# Patient Record
Sex: FEMALE | Race: WHITE | NOT HISPANIC OR LATINO | URBAN - METROPOLITAN AREA
[De-identification: names, ages, dates, MRNs, and addresses within clinical notes are randomized per-mention and may not be internally consistent; named-entity substitution may affect disease eponyms.]

---

## 2023-03-20 LAB
ALANINE AMINOTRANSFERASE (SGPT) (U/L) IN SER/PLAS: 40 U/L (ref 7–45)
ALBUMIN (G/DL) IN SER/PLAS: 4.1 G/DL (ref 3.4–5)
ALKALINE PHOSPHATASE (U/L) IN SER/PLAS: 56 U/L (ref 33–136)
ANION GAP IN SER/PLAS: 12 MMOL/L (ref 10–20)
ASPARTATE AMINOTRANSFERASE (SGOT) (U/L) IN SER/PLAS: 25 U/L (ref 9–39)
BILIRUBIN TOTAL (MG/DL) IN SER/PLAS: 0.4 MG/DL (ref 0–1.2)
C REACTIVE PROTEIN (MG/L) IN SER/PLAS: 0.74 MG/DL
CALCIUM (MG/DL) IN SER/PLAS: 9.2 MG/DL (ref 8.6–10.3)
CARBON DIOXIDE, TOTAL (MMOL/L) IN SER/PLAS: 34 MMOL/L (ref 21–32)
CHLORIDE (MMOL/L) IN SER/PLAS: 100 MMOL/L (ref 98–107)
CREATININE (MG/DL) IN SER/PLAS: 0.59 MG/DL (ref 0.5–1.05)
DEAMIDATED GLIADIN PEPTIDE IGA: <1 U/ML (ref 0–14)
DEAMIDATED GLIADIN PEPTIDE IGG: <1 U/ML (ref 0–14)
ERYTHROCYTE DISTRIBUTION WIDTH (RATIO) BY AUTOMATED COUNT: 13.2 % (ref 11.5–14.5)
ERYTHROCYTE MEAN CORPUSCULAR HEMOGLOBIN CONCENTRATION (G/DL) BY AUTOMATED: 30.3 G/DL (ref 32–36)
ERYTHROCYTE MEAN CORPUSCULAR VOLUME (FL) BY AUTOMATED COUNT: 89 FL (ref 80–100)
ERYTHROCYTES (10*6/UL) IN BLOOD BY AUTOMATED COUNT: 4.96 X10E12/L (ref 4–5.2)
GFR FEMALE: >90 ML/MIN/1.73M2
GLUCOSE (MG/DL) IN SER/PLAS: 127 MG/DL (ref 74–99)
HEMATOCRIT (%) IN BLOOD BY AUTOMATED COUNT: 43.9 % (ref 36–46)
HEMOGLOBIN (G/DL) IN BLOOD: 13.3 G/DL (ref 12–16)
HEPATITIS A VIRUS IGM AB PRESENCE IN SER/PLAS BY IMMUNOASSAY: NONREACTIVE
HEPATITIS B VIRUS CORE IGM AB PRESENCE IN SER/PLAS BY IMMUNOASSY: NONREACTIVE
HEPATITIS B VIRUS SURFACE AG PRESENCE IN SERUM: NONREACTIVE
HEPATITIS C VIRUS AB PRESENCE IN SERUM: NONREACTIVE
INR IN PPP BY COAGULATION ASSAY: 1 (ref 0.9–1.1)
IRON (UG/DL) IN SER/PLAS: 72 UG/DL (ref 35–150)
IRON BINDING CAPACITY (UG/DL) IN SER/PLAS: 345 UG/DL (ref 240–445)
IRON SATURATION (%) IN SER/PLAS: 21 % (ref 25–45)
LEUKOCYTES (10*3/UL) IN BLOOD BY AUTOMATED COUNT: 6.9 X10E9/L (ref 4.4–11.3)
NRBC (PER 100 WBCS) BY AUTOMATED COUNT: 0 /100 WBC (ref 0–0)
PLATELETS (10*3/UL) IN BLOOD AUTOMATED COUNT: 168 X10E9/L (ref 150–450)
POTASSIUM (MMOL/L) IN SER/PLAS: 3.6 MMOL/L (ref 3.5–5.3)
PROTEIN TOTAL: 6.8 G/DL (ref 6.4–8.2)
PROTHROMBIN TIME (PT) IN PPP BY COAGULATION ASSAY: 11.3 SEC (ref 9.8–13.4)
SODIUM (MMOL/L) IN SER/PLAS: 142 MMOL/L (ref 136–145)
TISSUE TRANSGLUTAMINASE IGG: <1 U/ML (ref 0–14)
TISSUE TRANSGLUTAMINASE, IGA: <1 U/ML (ref 0–14)
UREA NITROGEN (MG/DL) IN SER/PLAS: 10 MG/DL (ref 6–23)

## 2023-06-12 ENCOUNTER — TELEPHONE (OUTPATIENT)
Dept: PRIMARY CARE | Facility: CLINIC | Age: 63
End: 2023-06-12
Payer: MEDICARE

## 2023-06-12 NOTE — TELEPHONE ENCOUNTER
Reva from [facility] by [facility] called. She said they have to report to the Primary when she is seen in the nursing home ([facility])    Her phone number is [phone] if we ever needed to contact her

## 2023-07-27 ENCOUNTER — TELEPHONE (OUTPATIENT)
Dept: PRIMARY CARE | Facility: CLINIC | Age: 63
End: 2023-07-27
Payer: MEDICARE

## 2023-07-27 NOTE — TELEPHONE ENCOUNTER
Pt is due for her colonoscopy. According to her chart, she last had one 5/22/2011.    Please let me know when this is ordered so I can call pt.

## 2023-08-02 NOTE — TELEPHONE ENCOUNTER
Both of pts numbers are for care facilities. Both employees told me that pt has not been there in months. Unsure of how to reach pt

## 2023-09-15 ENCOUNTER — HOSPITAL ENCOUNTER (OUTPATIENT)
Dept: DATA CONVERSION | Facility: HOSPITAL | Age: 63
End: 2023-09-15
Attending: STUDENT IN AN ORGANIZED HEALTH CARE EDUCATION/TRAINING PROGRAM

## 2023-09-22 ENCOUNTER — HOSPITAL ENCOUNTER (OUTPATIENT)
Dept: DATA CONVERSION | Facility: HOSPITAL | Age: 63
End: 2023-09-22
Attending: STUDENT IN AN ORGANIZED HEALTH CARE EDUCATION/TRAINING PROGRAM | Admitting: STUDENT IN AN ORGANIZED HEALTH CARE EDUCATION/TRAINING PROGRAM
Payer: COMMERCIAL

## 2023-09-22 DIAGNOSIS — K29.70 GASTRITIS, UNSPECIFIED, WITHOUT BLEEDING: ICD-10-CM

## 2023-09-22 DIAGNOSIS — D12.2 BENIGN NEOPLASM OF ASCENDING COLON: ICD-10-CM

## 2023-09-22 DIAGNOSIS — D12.0 BENIGN NEOPLASM OF CECUM: ICD-10-CM

## 2023-09-22 DIAGNOSIS — K62.1 RECTAL POLYP: ICD-10-CM

## 2023-09-22 DIAGNOSIS — Z12.11 ENCOUNTER FOR SCREENING FOR MALIGNANT NEOPLASM OF COLON: ICD-10-CM

## 2023-09-22 DIAGNOSIS — K52.9 NONINFECTIVE GASTROENTERITIS AND COLITIS, UNSPECIFIED: ICD-10-CM

## 2023-09-22 DIAGNOSIS — R13.10 DYSPHAGIA, UNSPECIFIED: ICD-10-CM

## 2023-09-22 DIAGNOSIS — R13.10 DYSPHAGIA: ICD-10-CM

## 2023-09-22 DIAGNOSIS — K57.30 DIVERTICULOSIS OF LARGE INTESTINE WITHOUT PERFORATION OR ABSCESS WITHOUT BLEEDING: ICD-10-CM

## 2023-09-22 LAB — POCT GLUCOSE: 99 MG/DL (ref 74–99)

## 2023-09-27 LAB
COMPLETE PATHOLOGY REPORT: NORMAL
CONVERTED CLINICAL DIAGNOSIS-HISTORY: NORMAL
CONVERTED FINAL DIAGNOSIS: NORMAL
CONVERTED FINAL REPORT PDF LINK TO COPY AND PASTE: NORMAL
CONVERTED GROSS DESCRIPTION: NORMAL

## 2023-09-29 VITALS — HEIGHT: 61 IN | WEIGHT: 246.91 LBS | BODY MASS INDEX: 46.62 KG/M2

## 2023-10-04 PROBLEM — K52.9 CHRONIC DIARRHEA: Status: ACTIVE | Noted: 2023-10-04

## 2023-10-04 PROBLEM — H52.13 MYOPIA WITH PRESBYOPIA OF BOTH EYES: Status: ACTIVE | Noted: 2023-10-04

## 2023-10-04 PROBLEM — E66.01 MORBID (SEVERE) OBESITY DUE TO EXCESS CALORIES (MULTI): Status: ACTIVE | Noted: 2023-10-04

## 2023-10-04 PROBLEM — F25.9 SCHIZOAFFECTIVE DISORDER (MULTI): Status: ACTIVE | Noted: 2023-10-04

## 2023-10-04 PROBLEM — M06.9 ARTHRITIS OR POLYARTHRITIS, RHEUMATOID (MULTI): Status: ACTIVE | Noted: 2023-10-04

## 2023-10-04 PROBLEM — J30.2 SEASONAL ALLERGIES: Status: ACTIVE | Noted: 2023-10-04

## 2023-10-04 PROBLEM — R23.9 MACERATION OF PERIWOUND SKIN: Status: ACTIVE | Noted: 2023-10-04

## 2023-10-04 PROBLEM — H93.13 TINNITUS OF BOTH EARS: Status: ACTIVE | Noted: 2023-10-04

## 2023-10-04 PROBLEM — R13.12 DYSPHAGIA, OROPHARYNGEAL: Status: ACTIVE | Noted: 2023-10-04

## 2023-10-04 PROBLEM — I61.9 HEMORRHAGIC CEREBROVASCULAR ACCIDENT (CVA) (MULTI): Status: ACTIVE | Noted: 2023-10-04

## 2023-10-04 PROBLEM — R00.2 PALPITATIONS: Status: ACTIVE | Noted: 2023-10-04

## 2023-10-04 PROBLEM — G89.29 CHRONIC PAIN: Status: ACTIVE | Noted: 2023-10-04

## 2023-10-04 PROBLEM — D05.11 DUCTAL CARCINOMA IN SITU (DCIS) OF RIGHT BREAST: Status: ACTIVE | Noted: 2023-10-04

## 2023-10-04 PROBLEM — R26.2 IMPAIRED AMBULATION: Status: ACTIVE | Noted: 2023-10-04

## 2023-10-04 PROBLEM — L03.90 CELLULITIS: Status: ACTIVE | Noted: 2023-10-04

## 2023-10-04 PROBLEM — J34.2 NASAL SEPTAL DEVIATION: Status: ACTIVE | Noted: 2023-10-04

## 2023-10-04 PROBLEM — J34.829 INCOMPETENT NASAL VALVE: Status: ACTIVE | Noted: 2023-10-04

## 2023-10-04 PROBLEM — K21.9 GASTROESOPHAGEAL REFLUX DISEASE: Status: ACTIVE | Noted: 2023-10-04

## 2023-10-04 PROBLEM — I24.9 ACUTE CORONARY SYNDROME (MULTI): Status: ACTIVE | Noted: 2022-01-25

## 2023-10-04 PROBLEM — R26.89 IMBALANCE: Status: ACTIVE | Noted: 2023-10-04

## 2023-10-04 PROBLEM — R51.9 HEADACHE: Status: ACTIVE | Noted: 2023-10-04

## 2023-10-04 PROBLEM — R41.82 ALTERED MENTAL STATUS: Status: ACTIVE | Noted: 2023-10-04

## 2023-10-04 PROBLEM — F41.9 ANXIETY: Status: ACTIVE | Noted: 2023-10-04

## 2023-10-04 PROBLEM — R68.89 DECREASED ACTIVITY TOLERANCE: Status: ACTIVE | Noted: 2023-10-04

## 2023-10-04 PROBLEM — C50.519 MALIGNANT NEOPLASM OF LOWER-OUTER QUADRANT OF FEMALE BREAST (MULTI): Status: ACTIVE | Noted: 2023-10-04

## 2023-10-04 PROBLEM — R53.83 FATIGUE: Status: ACTIVE | Noted: 2023-10-04

## 2023-10-04 PROBLEM — E78.5 HYPERLIPIDEMIA: Status: ACTIVE | Noted: 2023-10-04

## 2023-10-04 PROBLEM — M81.0 OSTEOPOROSIS, SENILE: Status: ACTIVE | Noted: 2023-10-04

## 2023-10-04 PROBLEM — R53.1 WEAKNESS GENERALIZED: Status: ACTIVE | Noted: 2023-10-04

## 2023-10-04 PROBLEM — J34.3 HYPERTROPHY OF BOTH INFERIOR NASAL TURBINATES: Status: ACTIVE | Noted: 2023-10-04

## 2023-10-04 PROBLEM — N18.9 CHRONIC KIDNEY DISEASE, UNSPECIFIED: Status: ACTIVE | Noted: 2023-10-04

## 2023-10-04 PROBLEM — I47.10 PAROXYSMAL SVT (SUPRAVENTRICULAR TACHYCARDIA) (CMS-HCC): Status: ACTIVE | Noted: 2023-10-04

## 2023-10-04 PROBLEM — R29.90 NEUROLOGICAL SYMPTOMS: Status: ACTIVE | Noted: 2023-10-04

## 2023-10-04 PROBLEM — M17.9 OSTEOARTHRITIS OF KNEE, UNSPECIFIED: Status: ACTIVE | Noted: 2023-10-04

## 2023-10-04 PROBLEM — M95.0 INCOMPETENT NASAL VALVE: Status: ACTIVE | Noted: 2023-10-04

## 2023-10-04 PROBLEM — Z96.1 PSEUDOPHAKIA OF RIGHT EYE: Status: ACTIVE | Noted: 2023-10-04

## 2023-10-04 PROBLEM — H60.60 OTITIS EXTERNA, CHRONIC: Status: ACTIVE | Noted: 2023-10-04

## 2023-10-04 PROBLEM — R63.2 POLYPHAGIA: Status: ACTIVE | Noted: 2023-10-04

## 2023-10-04 PROBLEM — R06.02 SHORTNESS OF BREATH: Status: ACTIVE | Noted: 2023-10-04

## 2023-10-04 PROBLEM — G40.909 SEIZURE DISORDER (MULTI): Status: ACTIVE | Noted: 2023-10-04

## 2023-10-04 PROBLEM — J31.0 CHRONIC RHINITIS: Status: ACTIVE | Noted: 2023-10-04

## 2023-10-04 PROBLEM — G57.90 NEUROPATHY OF LOWER EXTREMITY: Status: ACTIVE | Noted: 2023-10-04

## 2023-10-04 PROBLEM — R06.83 PRIMARY SNORING: Status: ACTIVE | Noted: 2023-10-04

## 2023-10-04 PROBLEM — I26.99 PULMONARY EMBOLISM (MULTI): Status: ACTIVE | Noted: 2023-10-04

## 2023-10-04 PROBLEM — R60.0 LOWER LEG EDEMA: Status: ACTIVE | Noted: 2023-10-04

## 2023-10-04 PROBLEM — B37.2 CANDIDIASIS, INTERTRIGO: Status: ACTIVE | Noted: 2023-10-04

## 2023-10-04 PROBLEM — J44.9 COPD (CHRONIC OBSTRUCTIVE PULMONARY DISEASE) (MULTI): Status: ACTIVE | Noted: 2020-07-14

## 2023-10-04 PROBLEM — H53.8 BLURRING OF VISUAL IMAGE: Status: ACTIVE | Noted: 2023-10-04

## 2023-10-04 PROBLEM — N32.81 OVERACTIVE BLADDER: Status: ACTIVE | Noted: 2023-10-04

## 2023-10-04 PROBLEM — F17.200 TOBACCO DEPENDENCE SYNDROME: Status: ACTIVE | Noted: 2023-10-04

## 2023-10-04 PROBLEM — I10 ESSENTIAL HYPERTENSION: Status: ACTIVE | Noted: 2023-10-04

## 2023-10-04 PROBLEM — H91.93 BILATERAL HEARING LOSS: Status: ACTIVE | Noted: 2023-10-04

## 2023-10-04 PROBLEM — R11.2 NAUSEA AND VOMITING: Status: ACTIVE | Noted: 2023-10-04

## 2023-10-04 PROBLEM — F51.05 INSOMNIA RELATED TO ANOTHER MENTAL DISORDER: Status: ACTIVE | Noted: 2023-10-04

## 2023-10-04 PROBLEM — E55.9 VITAMIN D DEFICIENCY: Status: ACTIVE | Noted: 2023-10-04

## 2023-10-04 PROBLEM — R32 INCONTINENCE: Status: ACTIVE | Noted: 2023-10-04

## 2023-10-04 PROBLEM — F33.3 SEVERE EPISODE OF RECURRENT MAJOR DEPRESSIVE DISORDER, WITH PSYCHOTIC FEATURES (MULTI): Status: ACTIVE | Noted: 2023-10-04

## 2023-10-04 PROBLEM — R30.0 DYSURIA: Status: ACTIVE | Noted: 2023-10-04

## 2023-10-04 PROBLEM — M62.838 MUSCLE SPASM: Status: ACTIVE | Noted: 2023-10-04

## 2023-10-04 PROBLEM — R42 LIGHTHEADEDNESS: Status: ACTIVE | Noted: 2023-10-04

## 2023-10-04 PROBLEM — H55.01 CONGENITAL NYSTAGMUS: Status: ACTIVE | Noted: 2017-02-27

## 2023-10-04 PROBLEM — H54.40 BLIND LEFT EYE: Status: ACTIVE | Noted: 2023-10-04

## 2023-10-04 PROBLEM — H52.4 MYOPIA WITH PRESBYOPIA OF BOTH EYES: Status: ACTIVE | Noted: 2023-10-04

## 2023-10-04 PROBLEM — F70 MILD INTELLECTUAL DISABILITY: Status: ACTIVE | Noted: 2023-10-04

## 2023-10-04 PROBLEM — R73.03 PREDIABETES: Status: ACTIVE | Noted: 2023-10-04

## 2023-10-04 PROBLEM — S31.809A GLUTEAL CLEFT WOUND: Status: ACTIVE | Noted: 2023-10-04

## 2023-10-04 PROBLEM — H27.10: Status: ACTIVE | Noted: 2023-10-04

## 2023-10-04 PROBLEM — I67.89 ACUTE, BUT ILL-DEFINED, CEREBROVASCULAR DISEASE: Status: ACTIVE | Noted: 2023-10-04

## 2023-10-04 PROBLEM — G47.9 SLEEP DISTURBANCES: Status: ACTIVE | Noted: 2023-10-04

## 2023-10-04 PROBLEM — H18.422 BAND KERATOPATHY OF LEFT EYE: Status: ACTIVE | Noted: 2023-10-04

## 2023-10-04 RX ORDER — MONTELUKAST SODIUM 10 MG/1
1 TABLET ORAL NIGHTLY
COMMUNITY
Start: 2018-05-17

## 2023-10-04 RX ORDER — LEVETIRACETAM 500 MG/1
TABLET ORAL
COMMUNITY
Start: 2023-08-30

## 2023-10-04 RX ORDER — CODEINE PHOSPHATE AND GUAIFENESIN 10; 100 MG/5ML; MG/5ML
SOLUTION ORAL
COMMUNITY

## 2023-10-04 RX ORDER — NALTREXONE HYDROCHLORIDE 50 MG/1
TABLET, FILM COATED ORAL
COMMUNITY
Start: 2019-08-05 | End: 2024-02-02 | Stop reason: ALTCHOICE

## 2023-10-04 RX ORDER — METOPROLOL TARTRATE 25 MG/1
1 TABLET, FILM COATED ORAL 2 TIMES DAILY
COMMUNITY

## 2023-10-04 RX ORDER — BUSPIRONE HYDROCHLORIDE 10 MG/1
TABLET ORAL
COMMUNITY
Start: 2016-11-10 | End: 2024-02-02 | Stop reason: ALTCHOICE

## 2023-10-04 RX ORDER — DESONIDE 0.5 MG/G
CREAM TOPICAL
COMMUNITY

## 2023-10-04 RX ORDER — POLYETHYLENE GLYCOL 3350, SODIUM SULFATE ANHYDROUS, SODIUM BICARBONATE, SODIUM CHLORIDE, POTASSIUM CHLORIDE 236; 22.74; 6.74; 5.86; 2.97 G/4L; G/4L; G/4L; G/4L; G/4L
POWDER, FOR SOLUTION ORAL
COMMUNITY
Start: 2023-03-20

## 2023-10-04 RX ORDER — NYSTATIN 100000 [USP'U]/G
POWDER TOPICAL
COMMUNITY
Start: 2016-02-02

## 2023-10-04 RX ORDER — DULOXETIN HYDROCHLORIDE 20 MG/1
CAPSULE, DELAYED RELEASE ORAL
COMMUNITY
Start: 2016-10-11 | End: 2024-02-02 | Stop reason: ALTCHOICE

## 2023-10-04 RX ORDER — GABAPENTIN 400 MG/1
CAPSULE ORAL
COMMUNITY
Start: 2023-09-07

## 2023-10-04 RX ORDER — AZITHROMYCIN 500 MG/1
TABLET, FILM COATED ORAL
COMMUNITY
Start: 2020-08-21

## 2023-10-04 RX ORDER — KETOCONAZOLE 20 MG/ML
SHAMPOO, SUSPENSION TOPICAL
COMMUNITY
Start: 2018-09-18

## 2023-10-04 RX ORDER — OLOPATADINE HYDROCHLORIDE 2 MG/ML
SOLUTION/ DROPS OPHTHALMIC
COMMUNITY

## 2023-10-04 RX ORDER — ASPIRIN 81 MG/1
81 TABLET ORAL
COMMUNITY
Start: 2012-12-11

## 2023-10-04 RX ORDER — MOMETASONE FUROATE 50 UG/1
2 SPRAY, METERED NASAL
COMMUNITY
Start: 2012-12-11

## 2023-10-04 RX ORDER — CETIRIZINE HYDROCHLORIDE 10 MG/1
1 TABLET ORAL DAILY
COMMUNITY
Start: 2021-05-25

## 2023-10-04 RX ORDER — IBUPROFEN 200 MG
TABLET ORAL
COMMUNITY
Start: 2019-04-02

## 2023-10-04 RX ORDER — ANASTROZOLE 1 MG/1
TABLET ORAL
COMMUNITY
Start: 2018-09-14

## 2023-10-04 RX ORDER — GABAPENTIN 600 MG/1
TABLET ORAL
COMMUNITY

## 2023-10-04 RX ORDER — VORTIOXETINE 20 MG/1
TABLET, FILM COATED ORAL
COMMUNITY
Start: 2018-09-20 | End: 2023-10-19 | Stop reason: SDUPTHER

## 2023-10-04 RX ORDER — GABAPENTIN 300 MG/1
CAPSULE ORAL
COMMUNITY
Start: 2016-10-11

## 2023-10-04 RX ORDER — CHOLECALCIFEROL (VITAMIN D3) 1250 MCG
TABLET ORAL
COMMUNITY

## 2023-10-04 RX ORDER — MULTIVITAMIN
TABLET ORAL
COMMUNITY
Start: 2020-09-02

## 2023-10-04 RX ORDER — ERGOCALCIFEROL 1.25 MG/1
CAPSULE ORAL
COMMUNITY

## 2023-10-04 RX ORDER — ALENDRONATE SODIUM 70 MG/1
70 TABLET ORAL
COMMUNITY
Start: 2018-09-14

## 2023-10-04 RX ORDER — HYDROXYZINE HYDROCHLORIDE 10 MG/1
TABLET, FILM COATED ORAL
COMMUNITY
Start: 2023-09-07 | End: 2023-10-19 | Stop reason: ALTCHOICE

## 2023-10-04 RX ORDER — ALBUTEROL SULFATE 0.83 MG/ML
SOLUTION RESPIRATORY (INHALATION)
COMMUNITY
Start: 2019-07-11

## 2023-10-04 RX ORDER — LOPERAMIDE HYDROCHLORIDE 2 MG/1
CAPSULE ORAL
COMMUNITY

## 2023-10-04 RX ORDER — PRAVASTATIN SODIUM 40 MG/1
1 TABLET ORAL DAILY
COMMUNITY
Start: 2010-07-13

## 2023-10-04 RX ORDER — CARBOXYMETHYLCELLULOSE SODIUM 10 MG/ML
GEL OPHTHALMIC
COMMUNITY
Start: 2020-10-07

## 2023-10-04 RX ORDER — CARBOXYMETHYLCELLULOSE SODIUM 5 MG/ML
SOLUTION/ DROPS OPHTHALMIC
COMMUNITY
Start: 2019-07-18

## 2023-10-04 RX ORDER — BUPROPION HYDROCHLORIDE 150 MG/1
TABLET, EXTENDED RELEASE ORAL
COMMUNITY
Start: 2019-03-04 | End: 2024-02-02 | Stop reason: ALTCHOICE

## 2023-10-04 RX ORDER — PREDNISONE 10 MG/1
TABLET ORAL
COMMUNITY
Start: 2021-01-08

## 2023-10-04 RX ORDER — POLYETHYLENE GLYCOL 3350 17 G/17G
POWDER, FOR SOLUTION ORAL
COMMUNITY

## 2023-10-04 RX ORDER — ACETAMINOPHEN 325 MG/1
TABLET ORAL
COMMUNITY

## 2023-10-04 RX ORDER — DIVALPROEX SODIUM 125 MG/1
125 TABLET, DELAYED RELEASE ORAL 2 TIMES DAILY
COMMUNITY
Start: 2012-12-11

## 2023-10-04 RX ORDER — TRAMADOL HYDROCHLORIDE 50 MG/1
TABLET ORAL
COMMUNITY
Start: 2014-12-23

## 2023-10-04 RX ORDER — DICLOFENAC SODIUM 50 MG/1
TABLET, DELAYED RELEASE ORAL
COMMUNITY
Start: 2014-12-23

## 2023-10-04 RX ORDER — TRAZODONE HYDROCHLORIDE 50 MG/1
TABLET ORAL
COMMUNITY
Start: 2011-02-14 | End: 2024-02-02 | Stop reason: ALTCHOICE

## 2023-10-04 RX ORDER — DOCUSATE SODIUM 100 MG/1
CAPSULE, LIQUID FILLED ORAL
COMMUNITY
Start: 2020-07-15

## 2023-10-04 RX ORDER — CELECOXIB 200 MG/1
1 CAPSULE ORAL
COMMUNITY
Start: 2020-09-02

## 2023-10-04 RX ORDER — AZELASTINE 1 MG/ML
1 SPRAY, METERED NASAL 2 TIMES DAILY
COMMUNITY
Start: 2021-12-06

## 2023-10-04 RX ORDER — DULOXETIN HYDROCHLORIDE 30 MG/1
30 CAPSULE, DELAYED RELEASE ORAL 2 TIMES DAILY
COMMUNITY
End: 2024-02-02 | Stop reason: ALTCHOICE

## 2023-10-04 RX ORDER — FLUTICASONE PROPIONATE 50 UG/1
1 POWDER, METERED RESPIRATORY (INHALATION) 2 TIMES DAILY
COMMUNITY

## 2023-10-04 RX ORDER — INSULIN LISPRO 100 [IU]/ML
INJECTION, SOLUTION INTRAVENOUS; SUBCUTANEOUS
COMMUNITY
Start: 2023-09-06

## 2023-10-04 RX ORDER — OMEPRAZOLE 40 MG/1
CAPSULE, DELAYED RELEASE ORAL
COMMUNITY
Start: 2010-07-13

## 2023-10-04 RX ORDER — ARIPIPRAZOLE 30 MG/1
TABLET ORAL
COMMUNITY
Start: 2016-11-10 | End: 2023-10-19 | Stop reason: SDUPTHER

## 2023-10-04 RX ORDER — SIMETHICONE 80 MG
TABLET,CHEWABLE ORAL
COMMUNITY

## 2023-10-04 RX ORDER — ADHESIVE BANDAGE
BANDAGE TOPICAL
COMMUNITY

## 2023-10-04 RX ORDER — LEVETIRACETAM 1000 MG/1
1 TABLET ORAL 2 TIMES DAILY
COMMUNITY

## 2023-10-04 RX ORDER — BACLOFEN 10 MG/1
1 TABLET ORAL 3 TIMES DAILY
COMMUNITY
Start: 2020-07-14

## 2023-10-04 RX ORDER — METHOCARBAMOL 500 MG/1
TABLET, FILM COATED ORAL
COMMUNITY
Start: 2014-12-23

## 2023-10-04 RX ORDER — POTASSIUM CHLORIDE 750 MG/1
CAPSULE, EXTENDED RELEASE ORAL
COMMUNITY
Start: 2020-11-10

## 2023-10-04 RX ORDER — METFORMIN HYDROCHLORIDE 500 MG/1
0.5 TABLET ORAL 2 TIMES DAILY
COMMUNITY

## 2023-10-04 RX ORDER — FLUTICASONE PROPIONATE AND SALMETEROL 50; 250 UG/1; UG/1
POWDER RESPIRATORY (INHALATION)
COMMUNITY
Start: 2021-10-02

## 2023-10-04 RX ORDER — GLUCAGON 1 MG
VIAL (EA) INJECTION
COMMUNITY
Start: 2023-08-22

## 2023-10-04 RX ORDER — OMEPRAZOLE 20 MG/1
CAPSULE, DELAYED RELEASE ORAL
COMMUNITY
Start: 2023-09-06

## 2023-10-04 RX ORDER — NAPROXEN 500 MG/1
TABLET ORAL
COMMUNITY
Start: 2021-10-13

## 2023-10-04 RX ORDER — LIDOCAINE 50 MG/G
PATCH TOPICAL
COMMUNITY

## 2023-10-04 RX ORDER — KETOCONAZOLE 20 MG/G
1 CREAM TOPICAL
COMMUNITY
Start: 2012-12-10

## 2023-10-04 RX ORDER — FUROSEMIDE 40 MG/1
1 TABLET ORAL DAILY
COMMUNITY
Start: 2020-10-07

## 2023-10-04 RX ORDER — BUSPIRONE HYDROCHLORIDE 5 MG/1
5 TABLET ORAL 3 TIMES DAILY
COMMUNITY
End: 2024-02-02 | Stop reason: ALTCHOICE

## 2023-10-04 RX ORDER — PEN NEEDLE, DIABETIC 31 GX5/16"
NEEDLE, DISPOSABLE MISCELLANEOUS
COMMUNITY
Start: 2023-09-12

## 2023-10-04 RX ORDER — PRAVASTATIN SODIUM 20 MG/1
TABLET ORAL
COMMUNITY
Start: 2023-08-30

## 2023-10-04 RX ORDER — DULAGLUTIDE 0.75 MG/.5ML
INJECTION, SOLUTION SUBCUTANEOUS
COMMUNITY
Start: 2023-09-05

## 2023-10-04 RX ORDER — MIRABEGRON 50 MG/1
1 TABLET, EXTENDED RELEASE ORAL DAILY
COMMUNITY
Start: 2016-11-10

## 2023-10-04 RX ORDER — ALBUTEROL SULFATE 90 UG/1
AEROSOL, METERED RESPIRATORY (INHALATION)
COMMUNITY
Start: 2018-06-01

## 2023-10-04 RX ORDER — METHYLPREDNISOLONE 4 MG/1
TABLET ORAL
COMMUNITY
Start: 2020-08-21

## 2023-10-04 RX ORDER — MELOXICAM 15 MG/1
TABLET ORAL
COMMUNITY

## 2023-10-04 RX ORDER — REPAGLINIDE 2 MG/1
TABLET ORAL
COMMUNITY
Start: 2023-08-30

## 2023-10-04 RX ORDER — CLOTRIMAZOLE 1 %
CREAM (GRAM) TOPICAL
COMMUNITY

## 2023-10-04 RX ORDER — ONDANSETRON 8 MG/1
TABLET, ORALLY DISINTEGRATING ORAL
COMMUNITY
Start: 2021-02-22

## 2023-10-04 RX ORDER — DEXAMETHASONE 4 MG/1
TABLET ORAL
COMMUNITY
Start: 2020-12-18

## 2023-10-04 RX ORDER — OXYCODONE AND ACETAMINOPHEN 5; 325 MG/1; MG/1
TABLET ORAL
COMMUNITY
Start: 2023-08-17

## 2023-10-04 RX ORDER — HYDROXYZINE HYDROCHLORIDE 25 MG/1
TABLET, FILM COATED ORAL
COMMUNITY
Start: 2022-02-07 | End: 2023-10-19 | Stop reason: ALTCHOICE

## 2023-10-04 RX ORDER — SULFAMETHOXAZOLE AND TRIMETHOPRIM 800; 160 MG/1; MG/1
TABLET ORAL
COMMUNITY
Start: 2019-08-29

## 2023-10-04 RX ORDER — DILTIAZEM HYDROCHLORIDE 120 MG/1
CAPSULE, EXTENDED RELEASE ORAL
COMMUNITY
Start: 2019-07-18

## 2023-10-04 RX ORDER — MULTIVIT-MIN/FA/LYCOPEN/LUTEIN .4-300-25
TABLET ORAL
COMMUNITY
Start: 2018-04-25

## 2023-10-05 ENCOUNTER — APPOINTMENT (OUTPATIENT)
Dept: BEHAVIORAL HEALTH | Facility: CLINIC | Age: 63
End: 2023-10-05
Payer: MEDICARE

## 2023-10-19 ENCOUNTER — TELEMEDICINE (OUTPATIENT)
Dept: BEHAVIORAL HEALTH | Facility: CLINIC | Age: 63
End: 2023-10-19
Payer: MEDICARE

## 2023-10-19 VITALS
DIASTOLIC BLOOD PRESSURE: 78 MMHG | HEART RATE: 90 BPM | HEIGHT: 64 IN | SYSTOLIC BLOOD PRESSURE: 136 MMHG | WEIGHT: 244 LBS | BODY MASS INDEX: 41.66 KG/M2 | RESPIRATION RATE: 18 BRPM

## 2023-10-19 DIAGNOSIS — F41.9 ANXIETY: ICD-10-CM

## 2023-10-19 DIAGNOSIS — F33.3 SEVERE EPISODE OF RECURRENT MAJOR DEPRESSIVE DISORDER, WITH PSYCHOTIC FEATURES (MULTI): ICD-10-CM

## 2023-10-19 PROCEDURE — 99214 OFFICE O/P EST MOD 30 MIN: CPT | Performed by: NURSE PRACTITIONER

## 2023-10-19 RX ORDER — HYDROXYZINE HYDROCHLORIDE 10 MG/1
10 TABLET, FILM COATED ORAL 3 TIMES DAILY
Qty: 90 TABLET | Refills: 3 | Status: SHIPPED | OUTPATIENT
Start: 2023-10-19 | End: 2024-02-02 | Stop reason: SDUPTHER

## 2023-10-19 RX ORDER — ARIPIPRAZOLE 30 MG/1
TABLET ORAL
Qty: 30 TABLET | Refills: 3 | Status: SHIPPED | OUTPATIENT
Start: 2023-10-19 | End: 2024-02-02 | Stop reason: SDUPTHER

## 2023-10-19 RX ORDER — VORTIOXETINE 20 MG/1
TABLET, FILM COATED ORAL
Qty: 30 TABLET | Refills: 3 | Status: SHIPPED | OUTPATIENT
Start: 2023-10-19 | End: 2024-02-02 | Stop reason: SDUPTHER
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
## 2024-02-02 ENCOUNTER — TELEMEDICINE (OUTPATIENT)
Dept: BEHAVIORAL HEALTH | Facility: CLINIC | Age: 64
End: 2024-02-02
Payer: MEDICARE

## 2024-02-02 DIAGNOSIS — F25.0 SCHIZOAFFECTIVE DISORDER, BIPOLAR TYPE (MULTI): Primary | ICD-10-CM

## 2024-02-02 DIAGNOSIS — F33.3 SEVERE EPISODE OF RECURRENT MAJOR DEPRESSIVE DISORDER, WITH PSYCHOTIC FEATURES (MULTI): ICD-10-CM

## 2024-02-02 DIAGNOSIS — F41.9 ANXIETY: ICD-10-CM

## 2024-02-02 PROCEDURE — 99214 OFFICE O/P EST MOD 30 MIN: CPT | Performed by: NURSE PRACTITIONER

## 2024-02-02 RX ORDER — VORTIOXETINE 20 MG/1
TABLET, FILM COATED ORAL
Qty: 30 TABLET | Refills: 5 | Status: SHIPPED | OUTPATIENT
Start: 2024-02-02 | End: 2024-05-23 | Stop reason: SDUPTHER

## 2024-02-02 RX ORDER — HYDROXYZINE HYDROCHLORIDE 10 MG/1
10 TABLET, FILM COATED ORAL 3 TIMES DAILY
Qty: 90 TABLET | Refills: 5 | Status: SHIPPED | OUTPATIENT
Start: 2024-02-02 | End: 2024-05-23 | Stop reason: SDUPTHER

## 2024-02-02 RX ORDER — ARIPIPRAZOLE 30 MG/1
TABLET ORAL
Qty: 30 TABLET | Refills: 5 | Status: SHIPPED | OUTPATIENT
Start: 2024-02-02 | End: 2024-05-23 | Stop reason: SDUPTHER

## 2024-02-02 ASSESSMENT — ENCOUNTER SYMPTOMS
OCCASIONAL FEELINGS OF UNSTEADINESS: 1
DEPRESSION: 1
LOSS OF SENSATION IN FEET: 0

## 2024-02-02 ASSESSMENT — ABNORMAL INVOLUNTARY MOVEMENT SCALE (AIMS)
AIMS_SEVERITY: 0
FACIAL_EXPRESSION_MUSCLES: NONE, NORMAL
CURRENT_PROBLEMS_TEETH_DENTURES: NO
JAW: NONE, NORMAL
NECK_SHOULDER_HIPS: NONE, NORMAL
PATIENT_WEARS_DENTURES: NO
AIMS_PATIENT_AWARENESS: NO AWARENESS
AIMS_PATIENT_INCAPACITATION: NONE, NORMAL
LIPS_PARIETAL: NONE, NORMAL
UPPER_BODY_EXTREMITIES: NONE, NORMAL
LOWER_BODY_EXTREMITIES: NONE, NORMAL
TONGUE: NONE, NORMAL

## 2024-02-02 ASSESSMENT — PATIENT HEALTH QUESTIONNAIRE - PHQ9
10. IF YOU CHECKED OFF ANY PROBLEMS, HOW DIFFICULT HAVE THESE PROBLEMS MADE IT FOR YOU TO DO YOUR WORK, TAKE CARE OF THINGS AT HOME, OR GET ALONG WITH OTHER PEOPLE: NOT DIFFICULT AT ALL
SUM OF ALL RESPONSES TO PHQ9 QUESTIONS 1 AND 2: 2
2. FEELING DOWN, DEPRESSED OR HOPELESS: MORE THAN HALF THE DAYS
1. LITTLE INTEREST OR PLEASURE IN DOING THINGS: NOT AT ALL
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
## 2024-02-02 NOTE — PATIENT INSTRUCTIONS
ASSESSMENT: . Ms. Young presents at baseline mental health esparza.    Guardian, Jyoti Jones from her [employer] [phone].   See treatment plan below.    Pharmacogenomics Testing (PGT) results reviewed:  Significant Gene-drug Interaction: Vortioxetine (Trintellix) and Aripiprazole (Abilify) both with Clinical Considerations of   A) Serum level may be too high, lower doses may be required.  B) Use of this drug may increase risk of side effects.  C) FDA label identifies a potential gene-drug interaction for this medication.    PLAN:           problems treated   f/u requested to prevent relapse   medications renewed/re-ordered    1. Continue Vortioxetine (Trintellix) 20 mg by mouth Q AM for depression & anxiety.   2. Continue Aripiprazole (Abilify) 30 mg by mouth Q 8 PM for moods for SAD.  3. Continue hydroxyzine 10 mg TID for anxiety.       4. Risks, benefits, alternatives, off-label uses, and side effects of            medications have been discussed with patient/caregiver. There is no report of signs/symptoms consistent with medication-induced impairment in daily functioning. At this time, benefits of medication felt to outweigh potential risks. Will continue to reassess need for psychotropic medication at regular 3-month intervals.  5. Return to clinic 5-6 months or earlier if needed. Call [phone] to reschedule.     Thank you for seeing me today. If you have any questions or concerns, do not hesitate to contact my office.  Yazmin Bullock    TREATMENT TYPE         counseling and coordination of care; addressing signs and symptoms of illness; risks/benefits and side effects of medications; and behavioral approaches to illness.  This note was created using electronic dictation. There may be errors in syntax and meaning. Please contact the office with any questions.

## 2024-04-17 DIAGNOSIS — F33.3 SEVERE EPISODE OF RECURRENT MAJOR DEPRESSIVE DISORDER, WITH PSYCHOTIC FEATURES (MULTI): ICD-10-CM

## 2024-04-17 DIAGNOSIS — F25.1 SCHIZOAFFECTIVE DISORDER, DEPRESSIVE TYPE (MULTI): ICD-10-CM

## 2024-04-17 RX ORDER — ARIPIPRAZOLE 5 MG/1
TABLET ORAL
Qty: 30 TABLET | Refills: 0 | Status: SHIPPED | OUTPATIENT
Start: 2024-04-17 | End: 2024-05-23 | Stop reason: ALTCHOICE

## 2024-04-22 ENCOUNTER — OFFICE VISIT (OUTPATIENT)
Dept: BEHAVIORAL HEALTH | Facility: CLINIC | Age: 64
End: 2024-04-22
Payer: MEDICARE

## 2024-04-22 DIAGNOSIS — F25.0 SCHIZOAFFECTIVE DISORDER, BIPOLAR TYPE (MULTI): ICD-10-CM

## 2024-04-22 DIAGNOSIS — F33.3 SEVERE EPISODE OF RECURRENT MAJOR DEPRESSIVE DISORDER, WITH PSYCHOTIC FEATURES (MULTI): ICD-10-CM

## 2024-04-22 PROCEDURE — 1159F MED LIST DOCD IN RCRD: CPT | Performed by: COUNSELOR

## 2024-04-22 PROCEDURE — 90791 PSYCH DIAGNOSTIC EVALUATION: CPT | Performed by: COUNSELOR
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
## 2024-05-08 ENCOUNTER — TELEMEDICINE (OUTPATIENT)
Dept: BEHAVIORAL HEALTH | Facility: CLINIC | Age: 64
End: 2024-05-08
Payer: COMMERCIAL

## 2024-05-08 DIAGNOSIS — F25.0 SCHIZOAFFECTIVE DISORDER, BIPOLAR TYPE (MULTI): ICD-10-CM

## 2024-05-08 DIAGNOSIS — F33.3 SEVERE EPISODE OF RECURRENT MAJOR DEPRESSIVE DISORDER, WITH PSYCHOTIC FEATURES (MULTI): ICD-10-CM

## 2024-05-08 DIAGNOSIS — F41.9 ANXIETY: ICD-10-CM

## 2024-05-08 PROCEDURE — 1159F MED LIST DOCD IN RCRD: CPT | Performed by: COUNSELOR

## 2024-05-08 PROCEDURE — 90832 PSYTX W PT 30 MINUTES: CPT | Performed by: COUNSELOR

## 2024-05-08 NOTE — PROGRESS NOTES
"Total Time: 28 min  Diagnosis: Schizoaffective disorder, bipolar type (Multi), Anxiety, Severe episode of recurrent major depressive disorder, with psychotic features (Multi) (R/O PTSD)  Visit Type: via zoom   Reason for visit: managing her mood and worry     - staff noted overall things have been good, some verbal complaints and some scratches on her arms they believe are self-inflicted but it can be difficult to tell, but she overall seems in good spirits  - notes she has been going to work and enjoying it, going home and doing laundry   - she continued to note her desire to get out of the house and go on outings, she wants to see her sister (who lives 10 hours away); was very obsessive about this  - the psych assistant at the house joined via phone and in front of the patient stated she doesn't know her well but continued to note her “severe attention seeking BX and with her age it will take a long time to recondition her whining and crying” (also stated \"will talk to NP\" as they dont want her on a certain PRN)    Focused on:  - active listening  - rapport building  - start smaller, instead of a 10 hour trip, where is somewhere closer with someone she can visit with, a staff she can hang out with, “make a vacation” like go to a local beach and have a picnic, a tropical drink  - coping skills, obtaining attention in a positive way     "

## 2024-05-21 ENCOUNTER — APPOINTMENT (OUTPATIENT)
Dept: BEHAVIORAL HEALTH | Facility: CLINIC | Age: 64
End: 2024-05-21
Payer: MEDICARE

## 2024-05-23 ENCOUNTER — OFFICE VISIT (OUTPATIENT)
Dept: BEHAVIORAL HEALTH | Facility: CLINIC | Age: 64
End: 2024-05-23
Payer: MEDICARE

## 2024-05-23 DIAGNOSIS — F25.0 SCHIZOAFFECTIVE DISORDER, BIPOLAR TYPE (MULTI): Primary | ICD-10-CM

## 2024-05-23 DIAGNOSIS — F41.9 ANXIETY: ICD-10-CM

## 2024-05-23 DIAGNOSIS — F33.3 SEVERE EPISODE OF RECURRENT MAJOR DEPRESSIVE DISORDER, WITH PSYCHOTIC FEATURES (MULTI): ICD-10-CM

## 2024-05-23 PROCEDURE — 1159F MED LIST DOCD IN RCRD: CPT | Performed by: NURSE PRACTITIONER

## 2024-05-23 PROCEDURE — 99214 OFFICE O/P EST MOD 30 MIN: CPT | Performed by: NURSE PRACTITIONER

## 2024-05-23 RX ORDER — HYDROXYZINE HYDROCHLORIDE 10 MG/1
5 TABLET, FILM COATED ORAL 3 TIMES DAILY
Qty: 45 TABLET | Refills: 5 | Status: SHIPPED | OUTPATIENT
Start: 2024-05-23 | End: 2024-11-19

## 2024-05-23 RX ORDER — VORTIOXETINE 20 MG/1
TABLET, FILM COATED ORAL
Qty: 30 TABLET | Refills: 5 | Status: SHIPPED | OUTPATIENT
Start: 2024-05-23

## 2024-05-23 RX ORDER — ARIPIPRAZOLE 30 MG/1
TABLET ORAL
Qty: 30 TABLET | Refills: 5 | Status: SHIPPED | OUTPATIENT
Start: 2024-05-23

## 2024-05-23 NOTE — PATIENT INSTRUCTIONS
ASSESSMENT: . Ms. Yougn presents at baseline mental health wise.  Denies depression and suicidal ideation.    Discussed polypharmacy, metabolic concerns, and Jenny's medication seeking behaviors.   Complaining of daytime fatigue.  Hydroxyzine decreased today, with the goal of discontinuing.  Staff may contact in 1 month if no adverse effects or decompensation with decrease, then hydroxyzine will be discontinued.  Consider decrease Abilify next appt.  Consider discussing antiepileptic medications.  Levetiracetam has been slightly elevated (not toxic, unknown whether trough) over the last 2 blood draws.  Has not had a gabapentin lab draw.    Guardian, Jyoti Jones from her [employer] [phone].   See treatment plan below.    Pharmacogenomics Testing (PGT) results reviewed:  Significant Gene-drug Interaction: Vortioxetine (Trintellix) and Aripiprazole (Abilify) both with Clinical Considerations of   A) Serum level may be too high, lower doses may be required.  B) Use of this drug may increase risk of side effects.  C) FDA label identifies a potential gene-drug interaction for this medication.    PLAN:           problems treated   f/u requested to prevent relapse   medications renewed/re-ordered    1. Continue Vortioxetine (Trintellix) 20 mg by mouth Q AM for depression & anxiety.   2. Continue Aripiprazole (Abilify) 30 mg by mouth Q 8 PM for moods for SAD.  DC Abilify 5 mg PRN  3. Decrease due to daytime fatigue: hydroxyzine 5 mg (from 10 mg) TID for anxiety.       4. Risks, benefits, alternatives, off-label uses, and side effects of medications have been discussed with patient/caregiver. There is no report of signs/symptoms consistent with medication-induced impairment in daily functioning. At this time, benefits of medication felt to outweigh potential risks. Will continue to reassess need for psychotropic medication at regular 3-6 month intervals.  5. Return to clinic 3-4 months or earlier if needed. Call [phone] to reschedule.     Thank you for seeing me today. If you have any questions or concerns, do not hesitate to contact my office.  Kobe,  Yazmin
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
## 2024-05-29 ENCOUNTER — TELEMEDICINE (OUTPATIENT)
Dept: BEHAVIORAL HEALTH | Facility: CLINIC | Age: 64
End: 2024-05-29
Payer: MEDICARE

## 2024-05-29 DIAGNOSIS — F25.0 SCHIZOAFFECTIVE DISORDER, BIPOLAR TYPE (MULTI): ICD-10-CM

## 2024-05-29 DIAGNOSIS — F33.3 SEVERE EPISODE OF RECURRENT MAJOR DEPRESSIVE DISORDER, WITH PSYCHOTIC FEATURES (MULTI): ICD-10-CM

## 2024-05-29 DIAGNOSIS — F41.9 ANXIETY: ICD-10-CM

## 2024-05-29 PROCEDURE — 90832 PSYTX W PT 30 MINUTES: CPT | Performed by: COUNSELOR

## 2024-05-29 PROCEDURE — 1159F MED LIST DOCD IN RCRD: CPT | Performed by: COUNSELOR

## 2024-05-29 NOTE — PROGRESS NOTES
Total Time: 29 min  Diagnosis: Schizoaffective disorder, bipolar type (Multi), Anxiety, Severe episode of recurrent major depressive disorder, with psychotic features (Multi) (R/O PTSD)  Visit Type: via zoom   Reason for visit: managing her worry and mood     - staff update: overall things are good, complaining about her eye, the house has been chaotic, more ER visits for others, they have been trying to set her up for a dinner date with someone else in another house, have not heard back  - notes her mood has been good, did talk about her eye  - talked a lot about wanting to get out, dinner dates, wanting to go to [employer] to play games, work is good, has a boyfriend, Suman, but her mom tells her she cannot get  bc it will “mess up her SSI”    Focused on:  - active listening  - continue with her smaller goals  - coping skills, obtaining attention in a positive way

## 2024-06-19 ENCOUNTER — APPOINTMENT (OUTPATIENT)
Dept: BEHAVIORAL HEALTH | Facility: CLINIC | Age: 64
End: 2024-06-19
Payer: MEDICARE

## 2024-07-29 ENCOUNTER — APPOINTMENT (OUTPATIENT)
Dept: BEHAVIORAL HEALTH | Facility: CLINIC | Age: 64
End: 2024-07-29
Payer: MEDICARE

## 2024-07-29 DIAGNOSIS — F41.9 ANXIETY: ICD-10-CM

## 2024-07-29 DIAGNOSIS — F25.0 SCHIZOAFFECTIVE DISORDER, BIPOLAR TYPE (MULTI): ICD-10-CM

## 2024-07-29 DIAGNOSIS — F33.3 SEVERE EPISODE OF RECURRENT MAJOR DEPRESSIVE DISORDER, WITH PSYCHOTIC FEATURES (MULTI): ICD-10-CM

## 2024-07-29 PROCEDURE — 90832 PSYTX W PT 30 MINUTES: CPT | Performed by: COUNSELOR

## 2024-07-29 NOTE — PROGRESS NOTES
Total Time: 25 min  Diagnosis: Schizoaffective disorder, bipolar type (Multi), Anxiety, Severe episode of recurrent major depressive disorder, with psychotic features  Visit Type: via zoom   Reason for visit: managing her mood and worry    - staff update: overall things are good, has had some aggression towards staff, so they are “working on it”  - she notes she gets upset with staff bc they don't listen to her; they “throw her in the shower” and she is afraid shell fall, but they tell her she wont  - reports she doesn't understand why she gets upset sometimes  - it was difficult to hear her and for her to hear the clinician, she was outside and there were a lot of other people yelling and moving around; she was also falling asleep   - reports she got her engagement ring and she is very excited about that    Focused on:  - active listening  - continue with her smaller goals  - coping skills, obtaining attention in a positive way; ways to talk to staff, compromise

## 2024-08-02 ENCOUNTER — APPOINTMENT (OUTPATIENT)
Dept: BEHAVIORAL HEALTH | Facility: CLINIC | Age: 64
End: 2024-08-02
Payer: MEDICARE

## 2024-08-19 ENCOUNTER — APPOINTMENT (OUTPATIENT)
Dept: BEHAVIORAL HEALTH | Facility: CLINIC | Age: 64
End: 2024-08-19
Payer: MEDICARE

## 2024-09-16 ENCOUNTER — APPOINTMENT (OUTPATIENT)
Dept: BEHAVIORAL HEALTH | Facility: CLINIC | Age: 64
End: 2024-09-16
Payer: MEDICARE

## 2024-09-16 DIAGNOSIS — F41.9 ANXIETY: ICD-10-CM

## 2024-09-16 DIAGNOSIS — F25.0 SCHIZOAFFECTIVE DISORDER, BIPOLAR TYPE (MULTI): ICD-10-CM

## 2024-09-16 DIAGNOSIS — F33.3 SEVERE EPISODE OF RECURRENT MAJOR DEPRESSIVE DISORDER, WITH PSYCHOTIC FEATURES (MULTI): ICD-10-CM

## 2024-09-16 PROCEDURE — 90832 PSYTX W PT 30 MINUTES: CPT | Performed by: COUNSELOR

## 2024-09-16 NOTE — PROGRESS NOTES
Total Time: 23 min  Diagnosis: Schizoaffective disorder, bipolar type (Multi), Anxiety, Severe episode of recurrent major depressive disorder, with psychotic features (Multi)   Visit Type: via zoom   Reason for visit: managing her mood and worry     - staff update: overall doing really well, seems happier  - she noted she is not happy at day hab, wants a “real job” has her ISP tomorrow, wants to work with her hands again  - notes its too loud at day hab and only has 1 friend there  - reports at home she is happy bc she is engaged, but wants pop and more outings    Focused on:  - active listening  - continue with her smaller goals; make a list for her ISP tomorrow, advocate for herself and find ways to change things, make more friends/find social situations, ask for a quiet location, etc  - coping skills, obtaining attention in a positive way; ways to talk to staff, compromise

## 2024-09-23 ENCOUNTER — APPOINTMENT (OUTPATIENT)
Dept: BEHAVIORAL HEALTH | Facility: CLINIC | Age: 64
End: 2024-09-23
Payer: MEDICARE

## 2024-10-07 ENCOUNTER — TELEPHONE (OUTPATIENT)
Dept: BEHAVIORAL HEALTH | Facility: CLINIC | Age: 64
End: 2024-10-07
Payer: COMMERCIAL

## 2024-10-07 DIAGNOSIS — F41.9 ANXIETY: ICD-10-CM

## 2024-10-08 RX ORDER — HYDROXYZINE HYDROCHLORIDE 10 MG/1
TABLET, FILM COATED ORAL
Qty: 45 TABLET | Refills: 4 | OUTPATIENT
Start: 2024-10-08

## 2024-10-14 ENCOUNTER — APPOINTMENT (OUTPATIENT)
Dept: BEHAVIORAL HEALTH | Facility: CLINIC | Age: 64
End: 2024-10-14
Payer: COMMERCIAL

## 2024-10-14 DIAGNOSIS — F33.3 SEVERE EPISODE OF RECURRENT MAJOR DEPRESSIVE DISORDER, WITH PSYCHOTIC FEATURES (MULTI): ICD-10-CM

## 2024-10-14 DIAGNOSIS — F25.0 SCHIZOAFFECTIVE DISORDER, BIPOLAR TYPE (MULTI): ICD-10-CM

## 2024-10-14 DIAGNOSIS — F41.9 ANXIETY: ICD-10-CM

## 2024-10-14 PROCEDURE — 90832 PSYTX W PT 30 MINUTES: CPT | Performed by: COUNSELOR

## 2024-10-14 NOTE — PROGRESS NOTES
Total Time: 30 min  Diagnosis: Schizoaffective disorder, bipolar type (Multi), Anxiety, Severe episode of recurrent major depressive disorder, with psychotic features (Multi)  Visit Type: via zoom   Reason for visit: managing her mood and worry     - notes things are “ok” she has been yelling and screaming more at home and her staff told her if she doesn't stop, they will “send her to a new home”  - notes she wants to vacation with her sister (staff noted they did ask her sister and the sister said no)  - notes she needs a quiet space, notes she wants to lose weight so shes been skipping meals  - notes she misses specific staff on the weekends     Focused on:  - active listening  - continue with her smaller goals; make weekend plans before her favorite staff leave  - healthy living/eating  - coping skills, obtaining attention in a positive way; ways to talk to staff, compromise

## 2024-10-22 DIAGNOSIS — F41.9 ANXIETY: ICD-10-CM

## 2024-10-22 RX ORDER — HYDROXYZINE HYDROCHLORIDE 10 MG/1
5 TABLET, FILM COATED ORAL 3 TIMES DAILY
Qty: 45 TABLET | Refills: 1 | Status: SHIPPED | OUTPATIENT
Start: 2024-10-22

## 2024-11-04 ENCOUNTER — APPOINTMENT (OUTPATIENT)
Dept: BEHAVIORAL HEALTH | Facility: CLINIC | Age: 64
End: 2024-11-04
Payer: MEDICAID

## 2024-11-04 DIAGNOSIS — F41.9 ANXIETY: ICD-10-CM

## 2024-11-04 DIAGNOSIS — F33.3 SEVERE EPISODE OF RECURRENT MAJOR DEPRESSIVE DISORDER, WITH PSYCHOTIC FEATURES (MULTI): ICD-10-CM

## 2024-11-04 DIAGNOSIS — F25.0 SCHIZOAFFECTIVE DISORDER, BIPOLAR TYPE (MULTI): ICD-10-CM

## 2024-11-04 PROCEDURE — 90832 PSYTX W PT 30 MINUTES: CPT | Performed by: COUNSELOR

## 2024-11-04 NOTE — PROGRESS NOTES
Total Time: 31 min  Diagnosis:  Schizoaffective disorder, bipolar type (Multi), Anxiety, Severe episode of recurrent major depressive disorder, with psychotic features (Multi)   Visit Type: via zoom  Reason for visit: managing her mood    - notes overall things are better, she tried the weekend list and this past weekend was good; she notes no one yelled  - notes she might be getting a new power chair which she is really excited about  - seemed tired again and did not have her hearing aids in  - notes she is still skipping meals, but this time notes she does not like the good; spent time talking about how to problem solve; ask for a menu, a second option and that going out each day is not an option       Focused on:  - active listening  - continue with her smaller goals; make weekend plans before her favorite staff leave  - healthy living/eating  - coping skills, obtaining attention in a positive way; ways to talk to staff, compromise

## 2024-11-25 ENCOUNTER — APPOINTMENT (OUTPATIENT)
Dept: BEHAVIORAL HEALTH | Facility: CLINIC | Age: 64
End: 2024-11-25
Payer: COMMERCIAL

## 2024-12-09 ENCOUNTER — APPOINTMENT (OUTPATIENT)
Dept: BEHAVIORAL HEALTH | Facility: CLINIC | Age: 64
End: 2024-12-09
Payer: MEDICARE

## 2024-12-10 ENCOUNTER — TELEPHONE (OUTPATIENT)
Dept: BEHAVIORAL HEALTH | Facility: CLINIC | Age: 64
End: 2024-12-10
Payer: MEDICARE

## 2024-12-16 ENCOUNTER — APPOINTMENT (OUTPATIENT)
Dept: BEHAVIORAL HEALTH | Facility: CLINIC | Age: 64
End: 2024-12-16
Payer: COMMERCIAL

## 2024-12-16 ENCOUNTER — APPOINTMENT (OUTPATIENT)
Dept: BEHAVIORAL HEALTH | Facility: CLINIC | Age: 64
End: 2024-12-16
Payer: MEDICARE

## 2024-12-16 NOTE — PROGRESS NOTES
Hello,  I last saw her May 23, 2024.  This was the only time that she has been seen this year.  According to her chart:  1/19/2024 no-show  5/21/2024 canceled by patient  8/2/2024 canceled by patient  9/23/2024 no-show  11/25/2024 canceled by the patient via chart  12/31/2024 canceled by patient    Since starting therapy with Lauren in May 2024:  3 No show  2 cancel by patient    Message received from therapist that client was inpatient hospitalized over the weekend.  Therefore, no medication refills.  Yazmin Bullock

## 2024-12-17 ENCOUNTER — APPOINTMENT (OUTPATIENT)
Dept: BEHAVIORAL HEALTH | Facility: CLINIC | Age: 64
End: 2024-12-17
Payer: MEDICARE

## 2024-12-20 DIAGNOSIS — F41.9 ANXIETY: ICD-10-CM

## 2024-12-20 RX ORDER — HYDROXYZINE HYDROCHLORIDE 10 MG/1
5 TABLET, FILM COATED ORAL 3 TIMES DAILY
Qty: 45 TABLET | Refills: 1 | OUTPATIENT
Start: 2024-12-20

## 2024-12-26 DIAGNOSIS — F41.9 ANXIETY: ICD-10-CM

## 2024-12-26 RX ORDER — HYDROXYZINE HYDROCHLORIDE 10 MG/1
5 TABLET, FILM COATED ORAL 3 TIMES DAILY
Qty: 45 TABLET | Refills: 2 | Status: SHIPPED | OUTPATIENT
Start: 2024-12-26

## 2024-12-31 ENCOUNTER — APPOINTMENT (OUTPATIENT)
Dept: BEHAVIORAL HEALTH | Facility: CLINIC | Age: 64
End: 2024-12-31
Payer: MEDICARE

## 2025-01-21 ENCOUNTER — APPOINTMENT (OUTPATIENT)
Dept: BEHAVIORAL HEALTH | Facility: CLINIC | Age: 65
End: 2025-01-21
Payer: MEDICARE

## 2025-01-30 ENCOUNTER — APPOINTMENT (OUTPATIENT)
Dept: BEHAVIORAL HEALTH | Facility: CLINIC | Age: 65
End: 2025-01-30
Payer: MEDICARE

## 2025-02-04 ENCOUNTER — APPOINTMENT (OUTPATIENT)
Dept: BEHAVIORAL HEALTH | Facility: CLINIC | Age: 65
End: 2025-02-04
Payer: MEDICARE

## 2025-02-04 VITALS
HEART RATE: 65 BPM | DIASTOLIC BLOOD PRESSURE: 66 MMHG | RESPIRATION RATE: 18 BRPM | TEMPERATURE: 98.6 F | SYSTOLIC BLOOD PRESSURE: 125 MMHG

## 2025-02-04 DIAGNOSIS — F25.0 SCHIZOAFFECTIVE DISORDER, BIPOLAR TYPE (MULTI): ICD-10-CM

## 2025-02-04 DIAGNOSIS — F33.3 SEVERE EPISODE OF RECURRENT MAJOR DEPRESSIVE DISORDER, WITH PSYCHOTIC FEATURES (MULTI): ICD-10-CM

## 2025-02-04 DIAGNOSIS — F41.9 ANXIETY: ICD-10-CM

## 2025-02-04 PROCEDURE — 1159F MED LIST DOCD IN RCRD: CPT | Performed by: NURSE PRACTITIONER

## 2025-02-04 PROCEDURE — 99214 OFFICE O/P EST MOD 30 MIN: CPT | Performed by: NURSE PRACTITIONER

## 2025-02-04 PROCEDURE — 3074F SYST BP LT 130 MM HG: CPT | Performed by: NURSE PRACTITIONER

## 2025-02-04 PROCEDURE — 3078F DIAST BP <80 MM HG: CPT | Performed by: NURSE PRACTITIONER

## 2025-02-04 PROCEDURE — 1160F RVW MEDS BY RX/DR IN RCRD: CPT | Performed by: NURSE PRACTITIONER

## 2025-02-04 PROCEDURE — 1036F TOBACCO NON-USER: CPT | Performed by: NURSE PRACTITIONER

## 2025-02-04 RX ORDER — INSULIN ASPART INJECTION 100 [IU]/ML
2-12 INJECTION, SOLUTION SUBCUTANEOUS
COMMUNITY
Start: 2024-08-05

## 2025-02-04 RX ORDER — VORTIOXETINE 20 MG/1
TABLET, FILM COATED ORAL
Qty: 30 TABLET | Refills: 5 | Status: SHIPPED | OUTPATIENT
Start: 2025-02-04

## 2025-02-04 RX ORDER — LACOSAMIDE 50 MG/1
100 TABLET ORAL 2 TIMES DAILY
COMMUNITY
Start: 2025-01-03 | End: 2025-12-29

## 2025-02-04 RX ORDER — HYDROXYZINE HYDROCHLORIDE 10 MG/1
5 TABLET, FILM COATED ORAL 3 TIMES DAILY
Qty: 45 TABLET | Refills: 5 | Status: SHIPPED | OUTPATIENT
Start: 2025-02-04

## 2025-02-04 RX ORDER — ARIPIPRAZOLE 30 MG/1
TABLET ORAL
Qty: 30 TABLET | Refills: 5 | Status: SHIPPED | OUTPATIENT
Start: 2025-02-04

## 2025-02-04 RX ORDER — LAMOTRIGINE 100 MG/1
100 TABLET ORAL 2 TIMES DAILY
COMMUNITY
Start: 2025-01-17 | End: 2025-04-17

## 2025-02-04 RX ORDER — VIT C/E/ZN/COPPR/LUTEIN/ZEAXAN 250MG-90MG
CAPSULE ORAL
COMMUNITY
Start: 2024-05-08

## 2025-02-04 RX ORDER — FOLIC ACID 1 MG/1
1 TABLET ORAL
COMMUNITY
Start: 2024-12-11

## 2025-02-04 ASSESSMENT — PATIENT HEALTH QUESTIONNAIRE - PHQ9
5. POOR APPETITE OR OVEREATING: NOT AT ALL
9. THOUGHTS THAT YOU WOULD BE BETTER OFF DEAD, OR OF HURTING YOURSELF: NOT AT ALL
4. FEELING TIRED OR HAVING LITTLE ENERGY: NOT AT ALL
8. MOVING OR SPEAKING SO SLOWLY THAT OTHER PEOPLE COULD HAVE NOTICED. OR THE OPPOSITE, BEING SO FIGETY OR RESTLESS THAT YOU HAVE BEEN MOVING AROUND A LOT MORE THAN USUAL: NOT AT ALL
6. FEELING BAD ABOUT YOURSELF - OR THAT YOU ARE A FAILURE OR HAVE LET YOURSELF OR YOUR FAMILY DOWN: NOT AT ALL
7. TROUBLE CONCENTRATING ON THINGS, SUCH AS READING THE NEWSPAPER OR WATCHING TELEVISION: NOT AT ALL
1. LITTLE INTEREST OR PLEASURE IN DOING THINGS: NOT AT ALL
3. TROUBLE FALLING OR STAYING ASLEEP OR SLEEPING TOO MUCH: NOT AT ALL
2. FEELING DOWN, DEPRESSED OR HOPELESS: NOT AT ALL

## 2025-02-04 ASSESSMENT — ABNORMAL INVOLUNTARY MOVEMENT SCALE (AIMS)
AIMS_PATIENT_INCAPACITATION: NONE, NORMAL
JAW: NONE, NORMAL
NECK_SHOULDER_HIPS: NONE, NORMAL
AIMS_SEVERITY: 0
LIPS_PARIETAL: NONE, NORMAL
TONGUE: NONE, NORMAL
AIMS_PATIENT_AWARENESS: NO AWARENESS
LOWER_BODY_EXTREMITIES: NONE, NORMAL
CURRENT_PROBLEMS_TEETH_DENTURES: NO
FACIAL_EXPRESSION_MUSCLES: NONE, NORMAL
PATIENT_WEARS_DENTURES: NO
UPPER_BODY_EXTREMITIES: NONE, NORMAL
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
## 2025-02-04 NOTE — PATIENT INSTRUCTIONS
ASSESSMENT: . Ms. Young presents at baseline mental health esparza.    Guardian, Jyoti Jones from her [employer] [phone].   See treatment plan below.    Pharmacogenomics Testing (PGT) results reviewed:  Significant Gene-drug Interaction: Vortioxetine (Trintellix) and Aripiprazole (Abilify) both with Clinical Considerations of   A) Serum level may be too high, lower doses may be required.  B) Use of this drug may increase risk of side effects.  C) FDA label identifies a potential gene-drug interaction for this medication.    PLAN:           problems treated   f/u requested to prevent relapse   medications renewed/re-ordered    2/4/25: For seizure maintenance: keppra, lamictal, neurontin and Vimpat  1. Continue Vortioxetine (Trintellix) 20 mg by mouth Q AM for depression & anxiety.   2. Continue Aripiprazole (Abilify) 30 mg by mouth Q 8 PM for moods for SAD.  3. Continue hydroxyzine 5 mg TID for anxiety.       4. Risks, benefits, alternatives, off-label uses, and side effects of medications have been discussed with patient/caregiver. There is no report of signs/symptoms consistent with medication-induced impairment in daily functioning. At this time, benefits of medication felt to outweigh potential risks. Will continue to reassess need for psychotropic medication at regular 3-month intervals.  5. Return to clinic 5-6 months or earlier if needed. Call [phone] to reschedule.   6.  Next appointment: EKG.    Thank you for seeing me today. If you have any questions or concerns, do not hesitate to contact my office.  Yazmin Bullock    TREATMENT TYPE         counseling and coordination of care; addressing signs and symptoms of illness; risks/benefits and side effects of medications; and behavioral approaches to illness.  This note was created using electronic dictation. There may be errors in syntax and meaning. Please contact the office with any questions.

## 2025-02-10 ENCOUNTER — APPOINTMENT (OUTPATIENT)
Dept: BEHAVIORAL HEALTH | Facility: CLINIC | Age: 65
End: 2025-02-10
Payer: MEDICARE

## 2025-02-10 DIAGNOSIS — F33.3 SEVERE EPISODE OF RECURRENT MAJOR DEPRESSIVE DISORDER, WITH PSYCHOTIC FEATURES (MULTI): ICD-10-CM

## 2025-02-10 DIAGNOSIS — F25.0 SCHIZOAFFECTIVE DISORDER, BIPOLAR TYPE (MULTI): ICD-10-CM

## 2025-02-10 DIAGNOSIS — F41.9 ANXIETY: ICD-10-CM

## 2025-02-10 PROCEDURE — 90832 PSYTX W PT 30 MINUTES: CPT | Performed by: COUNSELOR

## 2025-02-10 NOTE — PROGRESS NOTES
"Total Time: 26 min  Diagnosis: Schizoaffective disorder, bipolar type (Multi), Anxiety, Severe episode of recurrent major depressive disorder, with psychotic features   Visit Type: via zoom   Reason for visit: managing her mood and worry     - notes physically she has had a hard time: Eye removed, broken femur, was in hospital for UTI  - notes she has been talking more to staff so things have been \"really good\" and she feels happy; fadi tot night to shine and is excited for valentines and her bday this week  - notes she wants to focus more on her healthy living and connecting with new staff (sania on weekends)     Focused on:  - active listening  - continue with her smaller goals; make weekend plans before her favorite staff leave  - healthy living/eating  - coping skills, obtaining attention in a positive way; ways to talk to staff, compromise     "

## 2025-02-18 ENCOUNTER — TELEPHONE (OUTPATIENT)
Dept: BEHAVIORAL HEALTH | Facility: CLINIC | Age: 65
End: 2025-02-18
Payer: MEDICARE

## 2025-02-18 NOTE — PROGRESS NOTES
Medication Has been approved pt and pharmacy made aware.     PROVIDER: CRISTOBAL Dominguez-RENNY   MEDICATION/DOSAGE: hydrOXYzine HCL (Atarax) 10 mg tablet   QTY/SUPPLY: 45 tabs /30 days   PRIOR AUTH COMPLETED VIA: cover my meds   INSURANCE: [employer]   REF #/KEY: [id number]  STATUS: Approved   BIN# [id number]  ID# [id number]

## 2025-03-10 ENCOUNTER — APPOINTMENT (OUTPATIENT)
Dept: BEHAVIORAL HEALTH | Facility: CLINIC | Age: 65
End: 2025-03-10
Payer: MEDICARE

## 2025-04-14 ENCOUNTER — APPOINTMENT (OUTPATIENT)
Dept: BEHAVIORAL HEALTH | Facility: CLINIC | Age: 65
End: 2025-04-14
Payer: COMMERCIAL

## 2025-04-14 DIAGNOSIS — F25.0 SCHIZOAFFECTIVE DISORDER, BIPOLAR TYPE (MULTI): ICD-10-CM

## 2025-04-14 DIAGNOSIS — F41.9 ANXIETY: ICD-10-CM

## 2025-04-14 DIAGNOSIS — F33.3 SEVERE EPISODE OF RECURRENT MAJOR DEPRESSIVE DISORDER, WITH PSYCHOTIC FEATURES (MULTI): ICD-10-CM

## 2025-04-14 PROCEDURE — 90832 PSYTX W PT 30 MINUTES: CPT | Performed by: COUNSELOR

## 2025-04-14 NOTE — PROGRESS NOTES
"Total Time: 23 min  Diagnosis: Schizoaffective disorder, bipolar, Anxiety, Severe episode of recurrent MDD, w/ psychotic features  Visit Type: via zoom  Reason for visit: managing her mood and worry    - notes she has a loaner wheelchair but its making her nervous bc she feels like she is going to tip backwards  - notes broke her leg (unclear if this is the same or a new one), notes it was a week ago from the lift  - talked about the positive things, Easter, being social, seeing friends   - notes worried about an apt \"[place] for her [place]\"      Focused on:  - active listening  - continue with her smaller goals  - healthy living/eating  - coping skills, obtaining attention in a positive way; ways to talk to staff, compromise     "

## 2025-04-15 NOTE — PROGRESS NOTES
April 15, 2025       Sandra Villagran MD  [address] [place] [address]  [place]  [place] [address]  Via In Basket      Patient: Mathieu Marie   YOB: 2018   Date of Visit: 4/15/2025       Dear Dr. Villagran:    Thank you for referring Mathieu Marie to me for evaluation. Below are my notes for this visit with him.    If you have questions, please do not hesitate to call me. I look forward to following your patient along with you.      Sincerely,        Hunter Hatfield MD        CC: No Recipients    Hunter Hatfield MD  4/15/2025 10:37 AM  Signed  Mathieu Marie is a 6 year old male who presents to the Dermatology clinic in consultation for evaluation of rash  Hx obtained from parent  Referred by Sandra Villagran MD  Location: buttocks, also a little suprapubic  Duration: early March, started as a pimple  Symptoms: none, was itchy  Treatment:   -clotrimazole cream bid (started 3/30), some help   -terbinafine cr (rx from pcp), hasn't started   Soap: dove sens, eucerin or aquaphor   Moisturizer: cetaphil cr   Does martial arts, wears a second layer of underwear with cup, sweats a lot  Rarely wet wipes    2/26/25 wart L foot (W[place]peel), tinea pedis (econazole)  8/15/22 AA (resolved), nl nevi   7/8/21 eczema (HC 2.5% oint)     PHYSICAL EXAMINATION:   Well appearing, normal mood and affect.  A general skin exam including scalp, face, eyelids, lips, neck, chest, abdomen, back, arms, legs, digits and nails was performed today and was normal except:  -medial buttocks mildly pink patch with tiny inflammatory papules within it    ASSESSMENT AND PLAN:  Irritant dermatitis and folliculitis from sweating and friction in the area, martial arts and double underwear contributing.  Mom describes some larger more inflamed papules so there is likely some staph folliculitis as well.   Diagnosis, treatment, course discussed.   Regimen as follows:  -clindamycin lotion and HC cream 2.5% bid to residual rash  -fragrance  Carlo Arce:    (not sure If you want me to just move up her psychiatric appointment with you/or if you will consider a medication adjustment or something else/)    Jenny Young:    :1960    *Brief Assessment/Observation: 2024    Mood Depressed, Flat Affect, Oriented - at her level, Denies suicidal/homicidal thoughts at this time!     Jenny reports feeling “Depressed and Anxious.” She asked if I could move up her Therapy Appointment- she is set for her 1st Therapy Appointment on Monday. Therapy Appts… very few available she was on a waiting list for a while..    Thanks in Advance!    Rosa Nichole MA  Psychology Assistant  [address] [place] [address]  Phone Number [phone]    Good afternoon,  Her psychiatric hospitalization in 2023, she was not on any psychotropic medications.  At that time, she was started on her current meds.  However, I have sent the following to her pharmacy:  Aripiprazole 5 mg by mouth daily as needed for non- redirectable auditory/visual hallucinations or suicidal/homicidal ideation, may be given regardless of when next or last controlled Abilify is scheduled.  Not to exceed a total of 35 mg (scheduled plus as needed) in a 24-hour.  Please call the office to see if I have a sooner appointment than 2024.  [phone].  Yazmin Bullock     free emollient before martial arts and qhs   -cont sensitive skin care  Fup prn

## 2025-05-12 ENCOUNTER — APPOINTMENT (OUTPATIENT)
Dept: BEHAVIORAL HEALTH | Facility: CLINIC | Age: 65
End: 2025-05-12
Payer: COMMERCIAL

## 2025-05-12 DIAGNOSIS — F33.3 SEVERE EPISODE OF RECURRENT MAJOR DEPRESSIVE DISORDER, WITH PSYCHOTIC FEATURES (MULTI): ICD-10-CM

## 2025-05-12 DIAGNOSIS — F41.9 ANXIETY: ICD-10-CM

## 2025-05-12 DIAGNOSIS — F25.0 SCHIZOAFFECTIVE DISORDER, BIPOLAR TYPE (MULTI): ICD-10-CM

## 2025-05-12 PROCEDURE — 90832 PSYTX W PT 30 MINUTES: CPT | Performed by: COUNSELOR

## 2025-05-12 NOTE — PROGRESS NOTES
"Total Time: 19 min  Diagnosis: Schizoaffective disorder, (bipolar type), Anxiety, Severe episode of recurrent MDD with psychotic features  Visit Type: via zoom  Reason for visit: managing her mood and worry     - struggled to hear today; didnt have hearing aids in and the room was loud  - notes she is really happy, not worried and has \"stopped yelling at the staff\" but mentioned later that she screams in her bed; when asked why, she notes its bc she needs help  - reports she still has the loaner wheelchair, but is not as worried anymore about tipping  - reports she is happy when she plays on the computer, tablet and is going to [place] sometime soon  - continues to have a lot of medical issues, kidney stones and a hernia; has several apts this week but looks forward to \"getting out and getting a pop after\"        Focused on:  - active listening  - continue with her smaller goals  - healthy living/eating  - coping skills, obtaining attention in a positive way; ways to talk to staff    "

## 2025-06-16 ENCOUNTER — APPOINTMENT (OUTPATIENT)
Dept: BEHAVIORAL HEALTH | Facility: CLINIC | Age: 65
End: 2025-06-16
Payer: MEDICARE

## 2025-06-16 DIAGNOSIS — F25.0 SCHIZOAFFECTIVE DISORDER, BIPOLAR TYPE (MULTI): ICD-10-CM

## 2025-06-16 DIAGNOSIS — F33.3 SEVERE EPISODE OF RECURRENT MAJOR DEPRESSIVE DISORDER, WITH PSYCHOTIC FEATURES (MULTI): ICD-10-CM

## 2025-06-16 DIAGNOSIS — F41.9 ANXIETY: ICD-10-CM

## 2025-06-16 PROCEDURE — 90832 PSYTX W PT 30 MINUTES: CPT | Performed by: COUNSELOR

## 2025-06-16 NOTE — PROGRESS NOTES
Total Time: 31 min  Diagnosis: Schizoaffective, bipolar type, Anxiety, Severe episode of recurrent MDD w/psychotic features  Visit Type: via zoom  Reason for visit: managing her mood and worry       - staff notes she has been struggling more with verbal aggression and not eating unless its [employer]; they think its related to her moms upcoming move (from [place] to [place])  - notes her mom is moving and she feels really sad, she wont see her and cannot go down there  - notes she will lash out at staff, go to her room and hit her head with hand  - notes she feels sad and that makes her not want to eat; also notes she doesn't like the texture of her diet (...) feels like a baby  - notes her medical issues are not much better and someone told her if she keeps going to the ER she will lose her Medicaid       Focused on:  - active listening  - continue with her smaller goals, getting attention in a positive way  - healthy living/eating  - coping skills, ways to talk to with staff vs yelling, make a plan to call/video mom each week, look at photos, self identified if she is busy/out of the house on outings she wont feel as sad or focus on mom

## 2025-07-14 ENCOUNTER — APPOINTMENT (OUTPATIENT)
Dept: BEHAVIORAL HEALTH | Facility: CLINIC | Age: 65
End: 2025-07-14
Payer: MEDICARE

## 2025-07-15 ENCOUNTER — APPOINTMENT (OUTPATIENT)
Dept: BEHAVIORAL HEALTH | Facility: CLINIC | Age: 65
End: 2025-07-15
Payer: MEDICARE

## 2025-07-15 VITALS — SYSTOLIC BLOOD PRESSURE: 123 MMHG | HEART RATE: 71 BPM | RESPIRATION RATE: 17 BRPM | DIASTOLIC BLOOD PRESSURE: 62 MMHG

## 2025-07-15 DIAGNOSIS — F41.9 ANXIETY: ICD-10-CM

## 2025-07-15 DIAGNOSIS — M06.9 RHEUMATOID ARTHRITIS, INVOLVING UNSPECIFIED SITE, UNSPECIFIED WHETHER RHEUMATOID FACTOR PRESENT (MULTI): ICD-10-CM

## 2025-07-15 DIAGNOSIS — F33.3 SEVERE EPISODE OF RECURRENT MAJOR DEPRESSIVE DISORDER, WITH PSYCHOTIC FEATURES (MULTI): ICD-10-CM

## 2025-07-15 PROCEDURE — 1036F TOBACCO NON-USER: CPT | Performed by: NURSE PRACTITIONER

## 2025-07-15 PROCEDURE — 99215 OFFICE O/P EST HI 40 MIN: CPT | Performed by: NURSE PRACTITIONER

## 2025-07-15 PROCEDURE — 3074F SYST BP LT 130 MM HG: CPT | Performed by: NURSE PRACTITIONER

## 2025-07-15 PROCEDURE — 1159F MED LIST DOCD IN RCRD: CPT | Performed by: NURSE PRACTITIONER

## 2025-07-15 PROCEDURE — 3078F DIAST BP <80 MM HG: CPT | Performed by: NURSE PRACTITIONER

## 2025-07-15 PROCEDURE — 1160F RVW MEDS BY RX/DR IN RCRD: CPT | Performed by: NURSE PRACTITIONER

## 2025-07-15 RX ORDER — VORTIOXETINE 20 MG/1
TABLET, FILM COATED ORAL
Qty: 30 TABLET | Refills: 2 | Status: SHIPPED | OUTPATIENT
Start: 2025-07-15

## 2025-07-15 RX ORDER — ARIPIPRAZOLE 30 MG/1
TABLET ORAL
Qty: 30 TABLET | Refills: 2 | Status: SHIPPED | OUTPATIENT
Start: 2025-07-15

## 2025-07-15 RX ORDER — HYDROXYZINE HYDROCHLORIDE 10 MG/1
5 TABLET, FILM COATED ORAL 3 TIMES DAILY
Qty: 45 TABLET | Refills: 2 | Status: SHIPPED | OUTPATIENT
Start: 2025-07-15

## 2025-07-15 ASSESSMENT — ABNORMAL INVOLUNTARY MOVEMENT SCALE (AIMS)
AIMS_PATIENT_INCAPACITATION: NONE, NORMAL
PATIENT_WEARS_DENTURES: NO
TONGUE: NONE, NORMAL
CURRENT_PROBLEMS_TEETH_DENTURES: NO
NECK_SHOULDER_HIPS: NONE, NORMAL
AIMS_SEVERITY: 0
LOWER_BODY_EXTREMITIES: NONE, NORMAL
AIMS_PATIENT_AWARENESS: NO AWARENESS
JAW: NONE, NORMAL
LIPS_PARIETAL: NONE, NORMAL
UPPER_BODY_EXTREMITIES: NONE, NORMAL
FACIAL_EXPRESSION_MUSCLES: NONE, NORMAL

## 2025-07-15 ASSESSMENT — ENCOUNTER SYMPTOMS: DEPRESSION: 1

## 2025-07-15 NOTE — PATIENT INSTRUCTIONS
Medications sent to [facility].    [facility] - [place], [place] - [address]  P: [phone]  F: [phone]  Please contact the office if this is not the correct pharmacy  1. Continue Vortioxetine (Trintellix) 20 mg by mouth Q AM for depression & anxiety.   2. Continue Aripiprazole (Abilify) 30 mg by mouth Q 8 PM for moods for SAD.  3. Continue hydroxyzine 5 mg TID for anxiety.       4. Risks, benefits, alternatives, off-label uses, and side effects of medications have been discussed with patient/caregiver. There is no report of signs/symptoms consistent with medication-induced impairment in daily functioning. At this time benefits of medication felt to outweigh potential risks. Will continue to reassess need for psychotropic medication at regular 3-month intervals.  5. Return to clinic 8/4/25 noon virtual earlier if needed. Call [phone] to reschedule.     Thank you for seeing me today. If you have any questions or concerns, do not hesitate to contact my office.  Yazmin Bullock    TREATMENT TYPE         counseling and coordination of care; addressing signs and symptoms of illness; risks/benefits and side effects of medications; and behavioral approaches to illness.  This note was created using electronic dictation. There may be errors in syntax and meaning. Please contact the office with any questions.
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
## 2025-08-04 ENCOUNTER — APPOINTMENT (OUTPATIENT)
Dept: BEHAVIORAL HEALTH | Facility: CLINIC | Age: 65
End: 2025-08-04
Payer: MEDICARE

## 2025-08-04 VITALS — HEART RATE: 72 BPM | RESPIRATION RATE: 16 BRPM | HEIGHT: 64 IN | BODY MASS INDEX: 41.88 KG/M2

## 2025-08-04 DIAGNOSIS — F33.3 SEVERE EPISODE OF RECURRENT MAJOR DEPRESSIVE DISORDER, WITH PSYCHOTIC FEATURES (MULTI): ICD-10-CM

## 2025-08-04 DIAGNOSIS — F41.9 ANXIETY: ICD-10-CM

## 2025-08-04 DIAGNOSIS — F25.0 SCHIZOAFFECTIVE DISORDER, BIPOLAR TYPE (MULTI): Primary | ICD-10-CM

## 2025-08-04 PROCEDURE — 1159F MED LIST DOCD IN RCRD: CPT | Performed by: NURSE PRACTITIONER

## 2025-08-04 PROCEDURE — 1160F RVW MEDS BY RX/DR IN RCRD: CPT | Performed by: NURSE PRACTITIONER

## 2025-08-04 PROCEDURE — 99214 OFFICE O/P EST MOD 30 MIN: CPT | Performed by: NURSE PRACTITIONER

## 2025-08-04 RX ORDER — ARIPIPRAZOLE 30 MG/1
TABLET ORAL
Qty: 30 TABLET | Refills: 2 | Status: SHIPPED | OUTPATIENT
Start: 2025-08-04

## 2025-08-04 RX ORDER — VORTIOXETINE 20 MG/1
TABLET, FILM COATED ORAL
Qty: 30 TABLET | Refills: 2 | Status: SHIPPED | OUTPATIENT
Start: 2025-08-04

## 2025-08-04 RX ORDER — HYDROXYZINE HYDROCHLORIDE 10 MG/1
5 TABLET, FILM COATED ORAL 3 TIMES DAILY
Qty: 45 TABLET | Refills: 2 | Status: SHIPPED | OUTPATIENT
Start: 2025-08-04
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
## 2025-08-04 NOTE — PATIENT INSTRUCTIONS
. Continue Vortioxetine (Trintellix) 20 mg by mouth Q AM for depression & anxiety.   2. Continue Aripiprazole (Abilify) 30 mg by mouth Q 8 PM for moods for SAD.  3. Continue hydroxyzine 5 mg TID for anxiety.       4. Risks, benefits, alternatives, off-label uses, and side effects of medications have been discussed with patient/caregiver. There is no report of signs/symptoms consistent with medication-induced impairment in daily functioning. At this time benefits of medication felt to outweigh potential risks. Will continue to reassess need for psychotropic medication at regular 3-month intervals.  5. Return to clinic Monday October 20, 2025 noon virtual earlier if needed. Call [phone] to reschedule.     Thank you for seeing me today. If you have any questions or concerns, do not hesitate to contact my office.  Yazmin Bullock    TREATMENT TYPE         counseling and coordination of care; addressing signs and symptoms of illness; risks/benefits and side effects of medications; and behavioral approaches to illness.  This note was created using electronic dictation. There may be errors in syntax and meaning. Please contact the office with any questions.

## 2025-08-18 ENCOUNTER — APPOINTMENT (OUTPATIENT)
Dept: BEHAVIORAL HEALTH | Facility: CLINIC | Age: 65
End: 2025-08-18
Payer: MEDICARE

## 2025-10-20 ENCOUNTER — APPOINTMENT (OUTPATIENT)
Dept: BEHAVIORAL HEALTH | Facility: CLINIC | Age: 65
End: 2025-10-20
Payer: MEDICARE